# Patient Record
Sex: MALE | Race: WHITE
[De-identification: names, ages, dates, MRNs, and addresses within clinical notes are randomized per-mention and may not be internally consistent; named-entity substitution may affect disease eponyms.]

---

## 2021-03-21 ENCOUNTER — HOSPITAL ENCOUNTER (EMERGENCY)
Dept: HOSPITAL 53 - M ED | Age: 21
Discharge: HOME | End: 2021-03-21
Payer: COMMERCIAL

## 2021-03-21 VITALS — BODY MASS INDEX: 27.5 KG/M2 | HEIGHT: 72 IN | WEIGHT: 203.05 LBS

## 2021-03-21 VITALS — DIASTOLIC BLOOD PRESSURE: 63 MMHG | SYSTOLIC BLOOD PRESSURE: 136 MMHG
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
## 2021-03-21 DIAGNOSIS — T14.8XXA: ICD-10-CM

## 2021-03-21 DIAGNOSIS — S52.511A: Primary | ICD-10-CM

## 2021-03-21 LAB
BUN SERPL-MCNC: 13 MG/DL (ref 7–18)
CALCIUM SERPL-MCNC: 9.4 MG/DL (ref 8.5–10.1)
CHLORIDE SERPL-SCNC: 104 MEQ/L (ref 98–107)
CO2 SERPL-SCNC: 29 MEQ/L (ref 21–32)
CREAT SERPL-MCNC: 1.03 MG/DL (ref 0.7–1.3)
GFR SERPL CREATININE-BSD FRML MDRD: > 60 ML/MIN/{1.73_M2} (ref 60–?)
GLUCOSE SERPL-MCNC: 96 MG/DL (ref 70–100)
HCT VFR BLD AUTO: 47.8 % (ref 42–52)
HGB BLD-MCNC: 16.4 G/DL (ref 13.5–17.5)
INR PPP: 1.04
MCH RBC QN AUTO: 30.1 PG (ref 27–33)
MCHC RBC AUTO-ENTMCNC: 34.3 G/DL (ref 32–36.5)
MCV RBC AUTO: 87.9 FL (ref 80–96)
PLATELET # BLD AUTO: 240 10^3/UL (ref 150–450)
POTASSIUM SERPL-SCNC: 3.9 MEQ/L (ref 3.5–5.1)
PROTHROMBIN TIME: 13.8 SECONDS (ref 12.5–14.3)
RBC # BLD AUTO: 5.44 10^6/UL (ref 4.3–6.1)
SODIUM SERPL-SCNC: 138 MEQ/L (ref 136–145)
WBC # BLD AUTO: 12.3 10^3/UL (ref 4–10)

## 2021-03-21 PROCEDURE — 70450 CT HEAD/BRAIN W/O DYE: CPT

## 2021-03-21 PROCEDURE — 80047 BASIC METABLC PNL IONIZED CA: CPT

## 2021-03-21 PROCEDURE — 85027 COMPLETE CBC AUTOMATED: CPT

## 2021-03-21 PROCEDURE — 99284 EMERGENCY DEPT VISIT MOD MDM: CPT

## 2021-03-21 PROCEDURE — 36415 COLL VENOUS BLD VENIPUNCTURE: CPT

## 2021-03-21 PROCEDURE — 70496 CT ANGIOGRAPHY HEAD: CPT

## 2021-03-21 PROCEDURE — 71250 CT THORAX DX C-: CPT

## 2021-03-21 PROCEDURE — 72125 CT NECK SPINE W/O DYE: CPT

## 2021-03-21 PROCEDURE — 85610 PROTHROMBIN TIME: CPT

## 2021-03-21 PROCEDURE — 80048 BASIC METABOLIC PNL TOTAL CA: CPT

## 2021-03-21 PROCEDURE — 73110 X-RAY EXAM OF WRIST: CPT
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
## 2021-03-21 NOTE — REPVR
PROCEDURE INFORMATION: 

Exam: XR Right Wrist 

Exam date and time: 3/21/2021 5:00 PM 

Age: 20 years old 

Clinical indication: Pain; Wrist; Right; Additional info: [facility] 



TECHNIQUE: 

Imaging protocol: XR Right wrist. 

Views: 3 or more views. 



COMPARISON: 

No relevant prior studies available. 



FINDINGS: 

Bones/joints: Acute oblique intra-articular radial styloid fracture with the 

fragment measuring roughly 10 mm in size, minimally distracted. Ulnar styloid 

tip avulsion is also present, appearing acute and measuring 1.5 mm.. Carpal 

bones align normally. No bone lesion. Scaphoid appears intact. Joint spaces are 

well-maintained for age. 

Soft tissues: Diffuse soft tissue swelling is present. 



IMPRESSION: 

Acute minimally displaced oblique intra-articular radial styloid fracture, with 

ulnar styloid tip fracture. 



Electronically signed by: Lobo Fleming On 03/21/2021  20:00:01 PM